# Patient Record
Sex: FEMALE
[De-identification: names, ages, dates, MRNs, and addresses within clinical notes are randomized per-mention and may not be internally consistent; named-entity substitution may affect disease eponyms.]

---

## 2024-09-18 PROBLEM — Z00.00 ENCOUNTER FOR PREVENTIVE HEALTH EXAMINATION: Status: ACTIVE | Noted: 2024-09-18

## 2024-09-24 ENCOUNTER — NON-APPOINTMENT (OUTPATIENT)
Age: 19
End: 2024-09-24

## 2024-09-25 ENCOUNTER — APPOINTMENT (OUTPATIENT)
Dept: GASTROENTEROLOGY | Facility: CLINIC | Age: 19
End: 2024-09-25
Payer: COMMERCIAL

## 2024-09-25 VITALS
DIASTOLIC BLOOD PRESSURE: 72 MMHG | TEMPERATURE: 97.7 F | HEIGHT: 64 IN | SYSTOLIC BLOOD PRESSURE: 106 MMHG | HEART RATE: 92 BPM | BODY MASS INDEX: 42.51 KG/M2 | WEIGHT: 249 LBS | OXYGEN SATURATION: 98 %

## 2024-09-25 DIAGNOSIS — K21.00 GASTRO-ESOPHAGEAL REFLUX DISEASE WITH ESOPHAGITIS, WITHOUT BLEEDING: ICD-10-CM

## 2024-09-25 DIAGNOSIS — K62.5 HEMORRHAGE OF ANUS AND RECTUM: ICD-10-CM

## 2024-09-25 DIAGNOSIS — E66.01 MORBID (SEVERE) OBESITY DUE TO EXCESS CALORIES: ICD-10-CM

## 2024-09-25 PROCEDURE — 99204 OFFICE O/P NEW MOD 45 MIN: CPT

## 2024-09-25 RX ORDER — POLYETHYLENE GLYCOL 3350 AND ELECTROLYTES WITH LEMON FLAVOR 236; 22.74; 6.74; 5.86; 2.97 G/4L; G/4L; G/4L; G/4L; G/4L
236 POWDER, FOR SOLUTION ORAL
Qty: 4000 | Refills: 0 | Status: ACTIVE | COMMUNITY
Start: 2024-09-25 | End: 1900-01-01

## 2024-09-25 NOTE — ASSESSMENT
[FreeTextEntry1] : 18F with no significant PMH who presents for BRBPR, constipation, and reflux.   #Obesity #Reflux #BRBPR - schedule EGD-colonoscopy at [facility] with golytely prep  - risks/benefits/alternatives and need for post-procedural escort discussed  Tye Grant MD Gastroenterology

## 2024-09-25 NOTE — HISTORY OF PRESENT ILLNESS
[FreeTextEntry1] : 18F with no significant PMH who presents for BRBPR, constipation, and reflux. Patient was seeing Dr. Brandon Patel but was referred to us to have her procedures completed in a hospital setting. She is currently taking omeprazole once daily with good control of reflux symptoms. She is taking fiber, which has resolved her constipation. She has also no longer seen BRBPR as her constipation as improved.   Last EGD last month with Grade D esophagitis and gastritis. No prior colonoscopy. The EGD was aborted due to respiratory instability.   Family Hx: maternal grandmother with UC  Social Hx: never smoker, no EtOH, no recreational drugs, Psychology major and pre-med at [employer]

## 2024-10-10 ENCOUNTER — NON-APPOINTMENT (OUTPATIENT)
Age: 19
End: 2024-10-10

## 2024-10-30 ENCOUNTER — NON-APPOINTMENT (OUTPATIENT)
Age: 19
End: 2024-10-30